# Patient Record
Sex: MALE | Race: WHITE
[De-identification: names, ages, dates, MRNs, and addresses within clinical notes are randomized per-mention and may not be internally consistent; named-entity substitution may affect disease eponyms.]

---

## 2018-10-06 ENCOUNTER — HOSPITAL ENCOUNTER (EMERGENCY)
Dept: HOSPITAL 61 - ER | Age: 83
Discharge: HOME | End: 2018-10-06
Payer: MEDICARE

## 2018-10-06 VITALS — BODY MASS INDEX: 25.76 KG/M2 | HEIGHT: 68 IN | WEIGHT: 170 LBS

## 2018-10-06 VITALS — SYSTOLIC BLOOD PRESSURE: 172 MMHG | DIASTOLIC BLOOD PRESSURE: 82 MMHG

## 2018-10-06 DIAGNOSIS — Z91.041: ICD-10-CM

## 2018-10-06 DIAGNOSIS — E11.9: ICD-10-CM

## 2018-10-06 DIAGNOSIS — I10: ICD-10-CM

## 2018-10-06 DIAGNOSIS — Z95.0: ICD-10-CM

## 2018-10-06 DIAGNOSIS — H65.193: Primary | ICD-10-CM

## 2018-10-06 PROCEDURE — 99281 EMR DPT VST MAYX REQ PHY/QHP: CPT

## 2018-10-06 NOTE — PHYS DOC
Past Medical History


Past Medical History:  Diabetes-Type II, Hypertension


Past Surgical History:  Appendectomy, Pacemaker


Alcohol Use:  None


Drug Use:  None





Adult General


Chief Complaint


Chief Complaint:  EARACHE/EAR PAIN





HPI


HPI





Patient is a 88  year old male with history of diabetes type 2, hypertension, 

who presents today stating "I need my ears strain". Patient states he is 

currently on antibiotics for an ear infection. He states he feels his ears are 

clogged and need to be drained. Patient denies any fever coughing or congestion.





Review of Systems


Review of Systems





Constitutional: Denies fever or chills []


Eyes: Denies change in visual acuity, redness, or eye pain []


HENT: Bilateral ear pain.  Denies nasal congestion or sore throat []


Respiratory: Denies cough or shortness of breath []


Cardiovascular: No additional information not addressed in HPI []


GI: Denies abdominal pain, nausea, vomiting, bloody stools or diarrhea []


: Denies dysuria or hematuria []


Musculoskeletal: Denies back pain or joint pain []


Integument: Denies rash or skin lesions []


Neurologic: Denies headache, focal weakness or sensory changes []





All other systems were reviewed and found to be within normal limits, except as 

documented in this note.





Allergies


Allergies





Allergies








Coded Allergies Type Severity Reaction Last Updated Verified


 


  iodine Allergy Unknown  10/6/18 Yes











Physical Exam


Physical Exam





Constitutional: Well developed, well nourished, no acute distress, non-toxic 

appearance. []


HENT: Normocephalic, atraumatic, bilateral external ears normal, oropharynx 

moist, no oral exudates, nose normal. []


Bilateral TM are mildly injected left worse than right. There is trace cerumen 

in the left ear canal, right ear canal has no cerumen. The small amount of 

cloudy fluid to the TM


Eyes: PERRLA, EOMI, conjunctiva normal, no discharge. [] 


Neck: Normal range of motion, no tenderness, supple, no stridor. [] 


Cardiovascular:Heart rate regular rhythm, no murmur []


Lungs & Thorax:  Bilateral breath sounds clear to auscultation []


Abdomen: Bowel sounds normal, soft, no tenderness, no masses, no pulsatile 

masses. [] 


Skin: Warm, dry, no erythema, no rash. [] 


Back: No tenderness, no CVA tenderness. [] 


Extremities: No tenderness, no cyanosis, no clubbing, ROM intact, no edema. [] 


Neurologic: Alert and oriented X 3, normal motor function, normal sensory 

function, no focal deficits noted. []


Psychologic: Affect normal, judgement normal, mood normal. []





Current Patient Data


Vital Signs





 Vital Signs








  Date Time  Temp Pulse Resp B/P (MAP) Pulse Ox O2 Delivery O2 Flow Rate FiO2


 


10/6/18 10:15 98.5 69 18 172/82 (112) 98 Room Air  





 98.5       











EKG


EKG


[]





Radiology/Procedures


Radiology/Procedures


[]





Course & Med Decision Making


Course & Med Decision Making


Pertinent Labs and Imaging studies reviewed. (See chart for details)





This is a 88-year-old male patient presenting to the ED today requesting his 

ears to be drained. He is currently on antibiotics amoxicillin for otitis media 

which is still present. He does not have any significant ear wax that can be 

cleaned out in the emergency room. I recommended he continues taking his 

antibiotics. He already has ibuprofen for pain. Recommended following up with 

the ENT in one to 2 weeks as needed.





Dragon Disclaimer


Dragon Disclaimer


This electronic medical record was generated, in whole or in part, using a 

voice recognition dictation system.





Departure


Departure


Impression:  


 Primary Impression:  


 Otalgia of both ears


 Additional Impression:  


 Otitis media


Disposition:  01 HOME, SELF-CARE


Condition:  STABLE


Referrals:  


GER PINON MD


follow up in 1 week


Patient Instructions:  Otalgia-Brief, Otitis Media, Adult





Additional Instructions:  


Your ears do not have any significant wax to be cleaned out. You have an ear 

infection. Please continue taking the pain medicines and antibotics you got 

from your doctor. Follow up with the ENT specialist provided next week





Problem Qualifiers








 Additional Impression:  


 Otitis media


 Otitis media type:  other nonsuppurative  Chronicity:  acute  Laterality:  

bilateral  Recurrence:  not specified as recurrent  Qualified Codes:  H65.193 - 

Other acute nonsuppurative otitis media, bilateral








KYREE PETERS APRN Oct 6, 2018 10:46

## 2018-12-18 ENCOUNTER — HOSPITAL ENCOUNTER (OUTPATIENT)
Dept: HOSPITAL 63 - SURG | Age: 83
Discharge: HOME | End: 2018-12-18
Attending: ANESTHESIOLOGY
Payer: OTHER GOVERNMENT

## 2018-12-18 DIAGNOSIS — E11.9: ICD-10-CM

## 2018-12-18 DIAGNOSIS — Z87.01: ICD-10-CM

## 2018-12-18 DIAGNOSIS — M46.1: Primary | ICD-10-CM

## 2018-12-18 DIAGNOSIS — Z90.49: ICD-10-CM

## 2018-12-18 DIAGNOSIS — Z88.8: ICD-10-CM

## 2018-12-18 DIAGNOSIS — Z87.891: ICD-10-CM

## 2018-12-18 DIAGNOSIS — Z79.899: ICD-10-CM

## 2018-12-18 DIAGNOSIS — M47.817: ICD-10-CM

## 2018-12-18 DIAGNOSIS — Z95.0: ICD-10-CM

## 2018-12-18 DIAGNOSIS — I10: ICD-10-CM

## 2018-12-18 DIAGNOSIS — Z91.013: ICD-10-CM

## 2018-12-18 DIAGNOSIS — Z79.82: ICD-10-CM

## 2018-12-18 DIAGNOSIS — Z79.4: ICD-10-CM

## 2018-12-18 PROCEDURE — 27096 INJECT SACROILIAC JOINT: CPT

## 2019-01-15 ENCOUNTER — HOSPITAL ENCOUNTER (OUTPATIENT)
Dept: HOSPITAL 63 - SURG | Age: 84
Discharge: HOME | End: 2019-01-15
Attending: ANESTHESIOLOGY
Payer: OTHER GOVERNMENT

## 2019-01-15 DIAGNOSIS — I10: ICD-10-CM

## 2019-01-15 DIAGNOSIS — Z95.5: ICD-10-CM

## 2019-01-15 DIAGNOSIS — Z87.01: ICD-10-CM

## 2019-01-15 DIAGNOSIS — Z95.0: ICD-10-CM

## 2019-01-15 DIAGNOSIS — Z88.8: ICD-10-CM

## 2019-01-15 DIAGNOSIS — Z79.4: ICD-10-CM

## 2019-01-15 DIAGNOSIS — Z79.82: ICD-10-CM

## 2019-01-15 DIAGNOSIS — E11.9: ICD-10-CM

## 2019-01-15 DIAGNOSIS — Z90.49: ICD-10-CM

## 2019-01-15 DIAGNOSIS — M54.16: Primary | ICD-10-CM

## 2019-01-15 DIAGNOSIS — Z91.013: ICD-10-CM

## 2019-01-15 PROCEDURE — 62323 NJX INTERLAMINAR LMBR/SAC: CPT

## 2019-01-16 ENCOUNTER — HOSPITAL ENCOUNTER (OUTPATIENT)
Dept: HOSPITAL 63 - CT | Age: 84
Discharge: HOME | End: 2019-01-16
Attending: ANESTHESIOLOGY
Payer: OTHER GOVERNMENT

## 2019-01-16 DIAGNOSIS — M48.061: Primary | ICD-10-CM

## 2019-01-16 DIAGNOSIS — M51.26: ICD-10-CM

## 2019-01-16 DIAGNOSIS — N20.0: ICD-10-CM

## 2019-01-16 DIAGNOSIS — M12.88: ICD-10-CM

## 2019-01-16 DIAGNOSIS — M43.16: ICD-10-CM

## 2019-01-16 DIAGNOSIS — K44.9: ICD-10-CM

## 2019-01-16 PROCEDURE — 72131 CT LUMBAR SPINE W/O DYE: CPT

## 2019-01-17 NOTE — RAD
CT of the lumbar spine without contrast, 1/16/2019:

 

HISTORY: Spinal stenosis, low back pain

 

Noncontrast scans were obtained with multiplanar reconstructions produced.

 

The lumbar vertebral heights are well-maintained. No fracture or 

destructive bony lesion is seen. There are vacuum disc phenomena 

throughout the lumbar spine with moderate scattered marginal spurring. 

There are degenerative changes involving multiple facet joints 

bilaterally, most severe at the L4-5 level.

 

There are scattered collections of gas in the spinal canal which appear to

lie in the epidural space. There are also scattered collections of gas in 

the soft tissues posteriorly. Has there been a recent intervention such as

an epidural injection?

 

At L1-2 there is mild posterior disc bulging and marginal spurring. The 

central spinal canal and neural foramina are well-maintained.

 

At L2-3 there is moderate broad-based posterior disc bulging, greatest 

laterally on the left. There is mild posterior ligamentous thickening 

related to facet joint arthropathy. The combination of findings is causing

mild central spinal stenosis with the thecal sac measuring 8 mm in AP 

diameter at the midline, as well as inferior foraminal narrowing 

bilaterally.

 

At L3-4 there is moderate broad-based posterior disc bulging. There is 

mild posterior ligamentous thickening related to moderate facet joint 

arthropathy with mild associated narrowing of the central spinal canal in 

a triangular configuration.

 

At L4-5 there is extensive hypertrophic degenerative change involving the 

facet joints with a mild associated anterolisthesis. There is associated 

posterior ligamentous thickening. There is mild broad-based posterior disc

bulging. The combination of findings is causing moderate central spinal 

stenosis in a triangular configuration as well as moderate left and mild 

right foraminal encroachment.

 

At L5-S1 there are moderate degenerative changes involving the facet 

joints with posterior ligamentous thickening. There is moderate 

broad-based posterior disc protrusion. This is causing considerable 

foraminal encroachment, worse on the right. The degree of central canal 

narrowing is difficult to clearly delineate in the presence of these 

epidural gas collections, however, there appears to be mild to moderate 

associated central spinal stenosis.

 

Incidental findings include the presence of moderate aortoiliac calcific 

plaquing. There is a small hiatal hernia. There is bilateral renal 

cortical scarring. A nonobstructing calculus is identified in the left 

kidney.

 

IMPRESSION:

1. 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 

spinal stenosis at several levels as described above.

2. Mild anterolisthesis at L4-5 due to severe facet joint arthropathy

3. Moderate to severe foraminal encroachment at L5-S1, worse on the right.

4. Epidural and posterior paraspinal gas collections suggesting a recent 

spinal intervention.

 

 

PQRS Compliance Statement:

 

One or more of the following individualized dose reduction techniques were

utilized for this examination:  

1. Automated exposure control  

2. Adjustment of the mA and/or kV according to patient size  

3. Use of iterative reconstruction technique

 

 

Electronically signed by: Rick Moritz, MD (1/17/2019 8:59 AM) [facility]

## 2019-01-29 ENCOUNTER — HOSPITAL ENCOUNTER (OUTPATIENT)
Dept: HOSPITAL 63 - SURG | Age: 84
Discharge: HOME | End: 2019-01-29
Attending: ANESTHESIOLOGY
Payer: OTHER GOVERNMENT

## 2019-01-29 DIAGNOSIS — Z95.0: ICD-10-CM

## 2019-01-29 DIAGNOSIS — Z87.891: ICD-10-CM

## 2019-01-29 DIAGNOSIS — M46.1: ICD-10-CM

## 2019-01-29 DIAGNOSIS — I10: ICD-10-CM

## 2019-01-29 DIAGNOSIS — Z79.4: ICD-10-CM

## 2019-01-29 DIAGNOSIS — Z90.49: ICD-10-CM

## 2019-01-29 DIAGNOSIS — M47.26: ICD-10-CM

## 2019-01-29 DIAGNOSIS — I25.10: ICD-10-CM

## 2019-01-29 DIAGNOSIS — E11.9: ICD-10-CM

## 2019-01-29 DIAGNOSIS — Z09: Primary | ICD-10-CM

## 2019-01-29 DIAGNOSIS — Z79.82: ICD-10-CM

## 2019-01-29 DIAGNOSIS — G89.29: ICD-10-CM

## 2019-01-29 PROCEDURE — 99214 OFFICE O/P EST MOD 30 MIN: CPT
